# Patient Record
Sex: FEMALE | Race: WHITE
[De-identification: names, ages, dates, MRNs, and addresses within clinical notes are randomized per-mention and may not be internally consistent; named-entity substitution may affect disease eponyms.]

---

## 2017-04-28 ENCOUNTER — HOSPITAL ENCOUNTER (OUTPATIENT)
Dept: HOSPITAL 80 - FSGY | Age: 37
Discharge: HOME | End: 2017-04-28
Payer: COMMERCIAL

## 2017-04-28 DIAGNOSIS — M95.0: ICD-10-CM

## 2017-04-28 DIAGNOSIS — J34.89: ICD-10-CM

## 2017-04-28 DIAGNOSIS — J35.01: Primary | ICD-10-CM

## 2017-04-28 PROCEDURE — 09BM4ZZ EXCISION OF NASAL SEPTUM, PERCUTANEOUS ENDOSCOPIC APPROACH: ICD-10-PCS

## 2017-04-28 PROCEDURE — 09BL4ZZ EXCISION OF NASAL TURBINATE, PERCUTANEOUS ENDOSCOPIC APPROACH: ICD-10-PCS

## 2017-04-28 PROCEDURE — 0CTPXZZ RESECTION OF TONSILS, EXTERNAL APPROACH: ICD-10-PCS

## 2017-05-16 NOTE — GOP
[f 
rep st]



                                                                OPERATIVE REPORT





DATE OF OPERATION:  04/28/2017



SURGEON:  Willian Camacho MD



ANESTHESIA:  General.



PREOPERATIVE DIAGNOSIS:  Nasal obstruction, acquired nasal deformity, bilateral 
chronic tonsillitis, left malar subcutaneous granuloma.



POSTOPERATIVE DIAGNOSIS:  Nasal obstruction, acquired nasal deformity, 
bilateral chronic tonsillitis, left malar subcutaneous granuloma.



PROCEDURE PERFORMED:  Septoplasty, inferior turbinate reduction, rhinoplasty, 
tonsillectomy, granuloma Kenalog injection.



FINDINGS:  Left subcutaneous diffuse 1 cm x 1.5 cm malar subcutaneous mass.  
Obstructive septal deviation, obstructive turbinate hypertrophy, internal nasal 
valve collapse, septal tip retraction with significant intranasal obstructive 
septal deviation.  Dorsal hump.  Nasal tip collapse with bifid tip.  Cryptic 
pitted tonsils with tonsilliths encased within each.



SPECIMENS:  Tonsils.



ESTIMATED BLOOD LOSS:  20 mL.



INDICATIONS:  Patient was seen in outpatient clinic and found to have a long 
history of nasal obstruction and nasal deformity secondary to multiple nasal 
fractures sustained when she was a teenager.  Further, she was found to have 
chronic tonsillitis with recurring tonsilliths.  Further, on the physical exam 
prior to that these procedures, a left malar subcutaneous granuloma was found 
and thought to be subsequent to prior cosmetic injection of facial fillers.  
Given her history and findings, she was determined to be an appropriate 
candidate for the above-stated procedures.  The risks, benefits, and 
alternatives to the procedures were explained at length to the patient who 
stated she understood and wished to go forward with the procedures.



DESCRIPTION OF PROCEDURE:  Patient was brought to the operating room by 
Anesthesiology and placed on the operating table.  Once the appropriate level 
of anesthesia was achieved, the nasion, intranasal vestibule, nasal septum, 
nasal dorsum via intranasal injection, and nasal columella were injected with 1
% lidocaine with 1:100,000 epinephrine.  Afrin-soaked pledgets were placed in 
bilateral nasal cavities.  After prepping the left cheek over the site of the 
granuloma, 0.4 ml of Kenalog 40 was injected into the central portion of the 
granuloma and at the circumference of the lesion. The patient was then prepped 
and draped in usual fashion.  The operating table was turned 90 degrees and a 
shoulder roll was placed.  A McIvor mouth gag was placed atraumatically in the 
oral cavity and used to retract the tongue.  This was then suspended on a Neal 
stand.  The right tonsil was excised first using Bovie electrocautery 
technique.  There was minimal bleeding with this and hemostasis was achieved 
using Bovie electrocautery alone.  Left tonsil was then excised, again using 
Bovie electrocautery technique.  There was minimal bleeding with this and 
hemostasis was achieved using Bovie electrocautery alone.  0.25% bupivacaine 
with 1:100,000 epinephrine was injected into bilateral tonsillar fossae.  The 
fossae were reinspected for bleeding.  None was found.  The patient was 
redraped and the shoulder roll was removed for the nasal portion of the 
procedure.  The intranasal portion of the procedure was completed using a 0-
degree rigid video endoscope.  A left hemitransfixion incision was created 
somewhat posteriorly using a needle-tip Bovie electrocautery.  The 
submucoperichondrial and submucoperiosteal dissection was then completed on 
this side using the [place] elevator and suction [place].  Once this was completed
, a vertical incision was made at the bony cartilaginous junction with a Lenn[place] 
elevator.  The contralateral mucosa was elevated off the bony septum with a 
suction [place].  A Neal scissor was then used to cut the bony septum superiorly.
  The remainder of the deviated bony septum was then removed with a Los.  
The deviated portion of the cartilaginous septum was incised with a D knife, 
taking care to leave ample dorsal and columellar septal cartilage for support.  
The incised quadrangular cartilage was elevated off the contralateral mucosa 
using a [place] elevator.  It was delivered with the Los and set aside for 
later possible graft use.  With the flaps replaced, the septum appears straight 
under endoscopic visualization.  The hemitransfixion incision was closed using 
two 4-0 chromic sutures.  The right inferior turbinate.  It was then reduced in 
a submucosal fashion using a 2 mm microdebrider.  There was good soft tissue 
reduction with this and a single anterior insertion site was utilized.  The 
inferior turbinate was then infractured and outfractured using a [place] 
elevator.  There was good lateralization with this and good visualization 
through the nasopharynx following this.  The left inferior turbinate was then 
reduced in a submucosal fashion using the 2 mm microdebrider.  It had good soft 
tissue reduction visualized throughout the task with the endoscope utilizing a 
single anterior turbinate puncture site.  The turbinate was then infractured 
and outfractured with good lateralization.  Good visualization on this side 
through the nasopharynx from the nasal vestibule.  Following marking the 
columella, a reverse gullwing incision was created within the columella using a 
15 blade.  A complete transfixion incision was completed with a 15 blade using 
nasal tip scissors to complete the transcolumellar dissection.  Mild needle-tip 
Bovie electrocautery was used for hemostasis at the columella centrally.  The 
tip scissors were then used to elevate nasal soft tissue in a sub perichondrial 
plane off the medial yen, middle yen, and lateral yen of the lower lateral 
cartilages bilaterally.  There was a significant separation of the left and 
right medial and middle yen visualized.  As well, there was a significant 
crease/fold in the left middle yen.  Dissection continued superiorly.  The 
dorsal septum was found to be quite retracted and not in contact with the 
medial crura.  A combination of [place] and nasal tip scissors were used to 
complete the subperichondrial dissection over the nasal bone over the bony 
dorsum and bilaterally over the nasal bones.  An Afrin-soaked pledget was 
placed within this pocket and used to retract soft tissues superiorly.  The 
nasal tips were then divided in order to dissect down to the tip and dorsum of 
the septum.  The upper lateral cartilages were then  from the septum 
using a 15 blade up to the nasal bones.  The pledget was then removed and a 
combination of heavy and medium rasps were used to shape the nasal dorsum and 
remove the significant dorsal hump.  This proceeded in multiple steps that 
included rasping, replacing the flap in order to inspect the profile of the 
nose.  The cycle was completed multiple times until appropriate outcome was 
achieved.  The septal cartilage identified earlier was then cut to create two 7 
mm x 4 mm  grafts.  These were able to be secured to the superior 
aspect of the dorsal septum, but as the septum had been forcibly retracted 
likely by prior trauma, there is no caudal suture site.  Following this, the 
shorter cartilages were able to be sutured to each other in a secure fashion, 
and then onto another portion of fashioned set-aside cartilage that provided a 
septal tip graft.  This was sutured in place using 5-0 PDS sutures.  The 
creased portion of the left middle yen/dome was trimmed with a 15 blade.  5-0 
PDS suture was used to secure this as a solid dome without a crease.  Bilateral 
tip sutures were then placed to elevate the medial crura.  Dome sutures were 
then completed to align the domes and bringing them together in a symmetric 
fashion.  Following this, the lower portions of the medial yen were advanced 
anteriorly and sutured with the 5-0 PDS to the natural septal cartilage.  These 
were revised as needed to achieve appropriate tip definition and tip rotation.  
The soft tissues were then replaced in anatomic position and a 5-0 chromic 
suture was used to bring subcutaneous tissues together.  The gullwing incision 
was closed using three 6-0 nylon sutures.  The bilateral transfixion incisions 
were closed then with 5-0 chromic sutures.  Bacitracin-coated Lanza splints 
were placed bilaterally and sutured in place with a single 3-0 Prolene suture.  
The nasal tip was suspended with Steri-Strips and nasal dorsum was taped prior 
to an Aquaplast splint being placed.  The patient tolerated the procedures 
well.  Oral cavity was again inspected for bleeding at the tonsillar fossae.  
None was found.  The patient was extubated in the operating room, and 
transferred in good condition to the post anesthesia care unit.



COMPLICATIONS:  None.





Job #:  [id number]

MTDD

## 2018-02-20 ENCOUNTER — HOSPITAL ENCOUNTER (OUTPATIENT)
Dept: HOSPITAL 80 - FIMAGING | Age: 38
End: 2018-02-20
Attending: OBSTETRICS & GYNECOLOGY
Payer: COMMERCIAL

## 2018-02-20 DIAGNOSIS — Z3A.12: ICD-10-CM

## 2018-02-20 DIAGNOSIS — O09.521: Primary | ICD-10-CM

## 2018-04-12 ENCOUNTER — HOSPITAL ENCOUNTER (OUTPATIENT)
Dept: HOSPITAL 80 - FIMAGING | Age: 38
End: 2018-04-12
Attending: OBSTETRICS & GYNECOLOGY
Payer: COMMERCIAL

## 2018-04-12 DIAGNOSIS — Z3A.19: ICD-10-CM

## 2018-04-12 DIAGNOSIS — I95.9: ICD-10-CM

## 2018-04-12 DIAGNOSIS — O09.522: Primary | ICD-10-CM

## 2018-04-12 DIAGNOSIS — O44.42: ICD-10-CM

## 2018-04-26 ENCOUNTER — HOSPITAL ENCOUNTER (OUTPATIENT)
Dept: HOSPITAL 80 - FIMAGING | Age: 38
End: 2018-04-26
Attending: OBSTETRICS & GYNECOLOGY
Payer: COMMERCIAL

## 2018-04-26 DIAGNOSIS — I95.9: ICD-10-CM

## 2018-04-26 DIAGNOSIS — O09.521: Primary | ICD-10-CM

## 2018-04-26 DIAGNOSIS — Z3A.21: ICD-10-CM

## 2018-09-09 ENCOUNTER — HOSPITAL ENCOUNTER (INPATIENT)
Dept: HOSPITAL 80 - FLD | Age: 38
LOS: 1 days | Discharge: HOME | End: 2018-09-10
Attending: OBSTETRICS & GYNECOLOGY | Admitting: OBSTETRICS & GYNECOLOGY
Payer: COMMERCIAL

## 2018-09-09 DIAGNOSIS — Z3A.41: ICD-10-CM

## 2018-09-09 LAB — PLATELET # BLD: 167 10^3/UL (ref 150–400)

## 2018-09-09 PROCEDURE — 10907ZC DRAINAGE OF AMNIOTIC FLUID, THERAPEUTIC FROM PRODUCTS OF CONCEPTION, VIA NATURAL OR ARTIFICIAL OPENING: ICD-10-PCS | Performed by: OBSTETRICS & GYNECOLOGY

## 2018-09-09 PROCEDURE — 3E033VJ INTRODUCTION OF OTHER HORMONE INTO PERIPHERAL VEIN, PERCUTANEOUS APPROACH: ICD-10-PCS | Performed by: OBSTETRICS & GYNECOLOGY

## 2018-09-09 NOTE — PDGENHP
History and Physical





- Chief Complaint


Early labor





- History of Present Illness


Adri is a 36 yo  today at 41w0d by JAZMINE of 18 who presented today in 

early labor, dilated to 4cm.  She's been having irregular, prodromal 

contractions for the past 48 hours, and has a h/o precipitous labor after SROM 

with her last pregnancy - less than 30 minutes from SROM to delivery.  She has 

IOL scheduled for tomorrow 0800 and would be interested in staying and 

delivering this evening.  GBS negative.





H/o four uncomplicated 's.  This pregnancy complicated by Adri having quite 

a few issues with symptomatic hypotension - was seen by Uma Juárez 

[facility] and ultimately had a negative workup including stress echo, etc.  Had low 

lying placenta, but this resolved.





H/o oligohydramnios (idiopathic) in 3rd trimester of all prior pregnancies.  H/

o laparotomy x 2 due to ovaries torsion a/w dermoids. Also AMA.  She's had 

reassuring  testing with NSTs and FISH's.  Does have distant h/o LEEP.





Prenatal Labs:


O pos


Antibody neg


RPR NR


Rubella imune


Hep B neg


HIV neg


TSH WNL 1st tri


Parvo immune


AFP normal


Innatal neg


Glucola 127


 Laboratory Tests











  17





  12:44 13:30 13:30


 


Gestat Glucose Screen   


 


AST   


 


ALT   


 


TSH   


 


RPR    NONREACTIVE


 


Hep Bs Antigen   NEGATIVE 


 


Hepatitis C Antibody  NEGATIVE  


 


HIV 1&2 Antibody  NEGATIVE  


 


Parvovirus B19 IgG Ab   


 


Parvovirus B19 IgM Ab   


 


Rubella IgG Antibody   160.00 


 


Group B Strep DNA   














  18





  13:30 13:10 14:39


 


Gestat Glucose Screen   


 


AST    26


 


ALT    35


 


TSH   1.210 


 


RPR   


 


Hep Bs Antigen   


 


Hepatitis C Antibody   


 


HIV 1&2 Antibody   


 


Parvovirus B19 IgG Ab  POSITIVE H  


 


Parvovirus B19 IgM Ab  NEGATIVE  


 


Rubella IgG Antibody   


 


Group B Strep DNA   














  18





  15:02 19:15


 


Gestat Glucose Screen  127 


 


AST  


 


ALT  


 


TSH  


 


RPR  


 


Hep Bs Antigen  


 


Hepatitis C Antibody  


 


HIV 1&2 Antibody  


 


Parvovirus B19 IgG Ab  


 


Parvovirus B19 IgM Ab  


 


Rubella IgG Antibody  


 


Group B Strep DNA   NEGATIVE























History Information





- Allergies/Home Medication List


Allergies/Adverse Reactions: 








Penicillins Allergy (Verified 04/10/17 11:40)


 Rash





Home Medications: 








NK [No Known Home Meds]  04/10/17 [Last Taken Unknown]





I have personally reviewed and updated: family history, medical history, social 

history, surgical history


Past Medical History: H/o chronic anemia, demoid cysts, ovarian torsion, 

symptomatic hypotension





- Surgical History


Additional surgical history: Laparotomy x 2 for ovarian cysts,  x 4, h/o LEEP





- Social History


Smoking Status: Never smoked


Alcohol Use: None





Review of Systems


Review of Systems: 





ROS: 10pt was reviewed & negative except for what was stated in HPI & below





Physical Exam


Physical Exam: 


NAD, appears uncomfortable with ctx's.


Gravid belly, longitudinal lie.





SCE on admission, 3-4cm, soft, vertex.


FHR 130bpm, mod trey, accels present, no decels.





Lab Data & Imaging Review





 18 16:20

















WBC  9.46 10^3/uL (3.80-9.50)   18  16:20    


 


RBC  4.29 10^6/uL (4.18-5.33)   18  16:20    


 


Hgb  14.2 g/dL (12.6-16.3)   18  16:20    


 


Hct  40.0 % (38.0-47.0)   18  16:20    


 


MCV  93.2 fL (81.5-99.8)   18  16:20    


 


MCH  33.1 pg (27.9-34.1)   18  16:20    


 


MCHC  35.5 g/dL (32.4-36.7)   18  16:20    


 


RDW  12.8 % (11.5-15.2)   18  16:20    


 


Plt Count  167 10^3/uL (150-400)   18  16:20    


 


MPV  11.4 fL (8.7-11.7)   18  16:20    


 


Neut % (Auto)  78.5 % (39.3-74.2)  H  18  16:20    


 


Lymph % (Auto)  12.4 % (15.0-45.0)  L  18  16:20    


 


Mono % (Auto)  8.1 % (4.5-13.0)   18  16:20    


 


Eos % (Auto)  0.2 % (0.6-7.6)  L  18  16:20    


 


Baso % (Auto)  0.3 % (0.3-1.7)   18  16:20    


 


Nucleat RBC Rel Count  0.0 % (0.0-0.2)   18  16:20    


 


Absolute Neuts (auto)  7.42 10^3/uL (1.70-6.50)  H  18  16:20    


 


Absolute Lymphs (auto)  1.17 10^3/uL (1.00-3.00)   18  16:20    


 


Absolute Monos (auto)  0.77 10^3/uL (0.30-0.80)   18  16:20    


 


Absolute Eos (auto)  0.02 10^3/uL (0.03-0.40)  L  18  16:20    


 


Absolute Basos (auto)  0.03 10^3/uL (0.02-0.10)   18  16:20    


 


Absolute Nucleated RBC  0.00 10^3/uL (0-0.01)   18  16:20    


 


Immature Gran %  0.5 % (0.0-1.1)   18  16:20    


 


Immature Gran #  0.05 10^3/uL (0.00-0.10)   18  16:20    


 


Patient ABO/Rh  O POSITIVE   18  16:20    


 


Antibody Screen  NEGATIVE   18  16:20    











Assessment & Plan


Assessment: 





36 yo  at 41w0d today, presnets in early/prodromal labor.  H/o 

precipitous deliveries.





Labor: Offered labor check and home if unchanged, or we did discuss that 

staying with the plan for IOL tonight vs leaving and coming back tomorrow AM is 

very reasonable and fine with me.  RBA discussed.  She'd be more comfortable 

staying and delivering tonight so as to not have an emergent trip to the 

hospital in the middle of the night, which I think is reasonable.  Will start 

with AROM and augment with Pitocin if no response after few hrs following 

rupture.





GBS neg, O pos, Rubella immune.


Does not think she'd like an epidural.





JOHN

## 2018-09-09 NOTE — OBDEL
Birth Info


Birth Type: Vaginal


Presentation at Delivery: Vertex


L&D Analgesia/Anesthesia Type: None


GBS+: No


Intrapartum Medications: 





 














Discontinued Medications














Generic Name Dose Route Start Last Admin





  Trade Name Mariaelena  PRN Reason Stop Dose Admin


 


Misoprostol  800 - 1,000 mcg  18 15:58  18 22:17





  Cytotec  MN   1,000 mcg





  ONCE PRN   Administration





  Vaginal Atony/Bleeding   














Vaginal Delivery





- Delivery Provider


Delivery Physician/CNM: Ramone Castelan





- Labor and Delivery


Onset of Contractions Date: 18


Onset of Contractions Time: 17:15


Onset of Contractions Type: Induced


Rupture of Membranes Date: 18


Rupture of Membranes Time: 17:15


Rupture of Membranes Type: Artificial


Amniotic Fluid Color: Clear


Dilation Complete Date: 18


Dilation Complete Time: 21:45


Placenta Delivery Date: 18


Placenta Delivery Time: 22:10


Total Hours of Labor: 4


Non-surgical Procedures: Amniotomy


Laceration: 1st Degree (Hemostatic)


Vaginal Sponge Count Correct: Yes


Vaginal Needle Count Correct: Yes


Vaginal Sweep Performed: Yes


EBL: 500cc


Delivery Events: Post Partum Hemorrhage (Mild, atony, treated with IV Pit, 

rectal Cytotec, and IM methergine x 1)





- Medications


Labor Augmentation/Induction Methods Used: Other (Specify) (AROM)


Labor Augmentation/Induction Indication: Post Dates





 Birth Data


JAZMINE: 18


Gestational Age: 41 week(s) and 0 day(s)


  ** Leiva


Delivery Date: 18


Delivery Time: 22:03


Sex of Infant: Female


Apgar Score (1 Min): 8


Apgar Score (5 Min): 9





ICD10 Worksheet


Patient Problems: 


 Problems











Problem Status Onset


 


Chronic anemia Acute  


 


PPH (postpartum hemorrhage) Acute  


 


Symptomatic hypotension Acute  


 


Vaginal delivery Active  














- ICD10 Problem Qualifiers


(1) Chronic anemia








(2) Symptomatic hypotension








(3) PPH (postpartum hemorrhage)


Qualifiers: 


   Postpartum hemorrhage type: third-stage   Qualified Code(s): O72.0 - Third-

stage hemorrhage

## 2018-09-10 VITALS — DIASTOLIC BLOOD PRESSURE: 67 MMHG | SYSTOLIC BLOOD PRESSURE: 104 MMHG

## 2018-09-10 RX ADMIN — ACETAMINOPHEN SCH: 325 TABLET ORAL at 18:49

## 2018-09-10 RX ADMIN — ACETAMINOPHEN SCH: 325 TABLET ORAL at 06:24

## 2018-09-10 RX ADMIN — ACETAMINOPHEN SCH: 325 TABLET ORAL at 13:52

## 2018-09-10 RX ADMIN — IBUPROFEN SCH MG: 600 TABLET ORAL at 12:48

## 2018-09-10 RX ADMIN — ACETAMINOPHEN SCH: 325 TABLET ORAL at 02:48

## 2018-09-10 RX ADMIN — IBUPROFEN SCH MG: 600 TABLET ORAL at 19:02

## 2018-09-10 RX ADMIN — IBUPROFEN SCH MG: 600 TABLET ORAL at 06:23

## 2018-09-10 NOTE — OBPP
PostPartum Progress Note


Assessment/Plan: 


Assessment:





 ppd# 1 s/p 


breast feedng


rh pos


anemia





Plan:


routine post partum care 


iron


discharge instructions





09/10/18 19:00





Subjective/Postpartum Course: 





09/10/18 19:03


patient is doing great.    has had intermittent episodes of increased bleeding 

but overall normal lochia.  breast feeding is going well.  denies headache and 

changes in vision.  bedside ultrasound done.  thin stripe noted with 

presumptive clot in the lower uterine segment.  patient declines exam to 

remove.   post partum precautions reviewed.  


Objective: 





 





 09/10/18 06:30 





 











Patient ABO/Rh  O POSITIVE   18  16:20    








 











Temp Pulse Resp BP Pulse Ox


 


 36.8 C   75   16   104/67   95 


 


 09/10/18 08:00  09/10/18 08:00  09/10/18 08:00  09/10/18 08:00  09/10/18 01:05











Uterine Position/Fundal Height: Umbilicus -3


Uterine Tone: Firm





PostPartum Physical Exam





- Physical Exam


Neck: non-tender, full range of motion, supple


Respiratory: chest non-tender, lungs clear, normal breath sounds


Cardiac/Chest: normal peripheral pulses, regular rate, rhythm


Abdomen: normal bowel sounds, non-tender, other (fundus firm and non tender)


Extremities: normal range of motion, non-tender, normal inspection, normal 

capillary refill


Skin: normal color, warm/dry


Neuro/Psych: no motor/sensory deficits, alert, normal mood/affect, oriented x 3 Triangulation (Location Of Lesion In Relation To Distance From Anatomical Landmarks): [phone]

## 2018-09-10 NOTE — OBGCSDC
General Delivery Information





- General Info


: 5


Para: 5


Abortions: 0


Birth Type: Vaginal


L&D Analgesia/Anesthesia Type: None


Admission Date: 18


Labs: 





 











Patient ABO/Rh  O POSITIVE   18  16:20    


 


Hct  30.4 % (38.0-47.0)  L  09/10/18  06:30    














- Hospital Course


Antepartum: 





09/10/18 19:12


uncomplicated pregnancy.  evaluation of symptomatic hypotension.   negative 

genetic testing.  


Postpartum: 





09/10/18 19:03


patient is doing great.    has had intermittent episodes of increased bleeding 

but overall normal lochia.  breast feeding is going well.  denies headache and 

changes in vision.  bedside ultrasound done.  thin stripe noted with 

presumptive clot in the lower uterine segment.  patient declines exam to 

remove.   post partum precautions reviewed.  





Vaginal Birth





- Delivery Provider


Delivery Physician/CNM: Ramone Castelan





- Diagnosis


Labor: Induced


Rupture of Membranes Type: Artificial


Amniotic Fluid Color: Clear


Laceration: 1st Degree (Hemostatic)


Delivery Events: Post Partum Hemorrhage (Mild, atony, treated with IV Pit, 

rectal Cytotec, and IM methergine x 1)





- Procedures


Non-surgical Procedures: Amniotomy





 Birth





-  Delivery


Non-surgical Procedures: Amniotomy


EBL: 500cc





[place] Birth Data


JAZMINE: 18


Gestational Age: 41 week(s) and 1 day(s)


  ** Leiva


Delivery Date: 18


Delivery Time: 22:03


Sex of Infant: Female


 Weight (gm): 3656 g


Apgar Score (1 Min): 8


Apgar Score (5 Min): 9





Discharge Information





- Discharge Information


Condition: Good


Instruction/Follow Up: Four Weeks (post partum mood check ), Six Weeks (post 

partum visit)

## 2018-09-29 ENCOUNTER — HOSPITAL ENCOUNTER (OUTPATIENT)
Dept: HOSPITAL 80 - FIMAGING | Age: 38
End: 2018-09-29
Attending: PSYCHIATRY & NEUROLOGY
Payer: COMMERCIAL

## 2018-09-29 DIAGNOSIS — H02.409: Primary | ICD-10-CM
